# Patient Record
Sex: FEMALE | Race: WHITE
[De-identification: names, ages, dates, MRNs, and addresses within clinical notes are randomized per-mention and may not be internally consistent; named-entity substitution may affect disease eponyms.]

---

## 2020-06-13 ENCOUNTER — HOSPITAL ENCOUNTER (EMERGENCY)
Dept: HOSPITAL 26 - MED | Age: 35
Discharge: HOME | End: 2020-06-13
Payer: COMMERCIAL

## 2020-06-13 VITALS — DIASTOLIC BLOOD PRESSURE: 50 MMHG | SYSTOLIC BLOOD PRESSURE: 91 MMHG

## 2020-06-13 VITALS — HEIGHT: 65 IN | BODY MASS INDEX: 30.32 KG/M2 | WEIGHT: 182 LBS

## 2020-06-13 VITALS — DIASTOLIC BLOOD PRESSURE: 76 MMHG | SYSTOLIC BLOOD PRESSURE: 121 MMHG

## 2020-06-13 DIAGNOSIS — R11.10: ICD-10-CM

## 2020-06-13 DIAGNOSIS — Z79.899: ICD-10-CM

## 2020-06-13 DIAGNOSIS — R56.9: Primary | ICD-10-CM

## 2020-06-13 LAB
ALBUMIN FLD-MCNC: 3.7 G/DL (ref 3.4–5)
ANION GAP SERPL CALCULATED.3IONS-SCNC: 15.5 MMOL/L (ref 8–16)
AST SERPL-CCNC: 16 U/L (ref 15–37)
BASOPHILS # BLD AUTO: 0 K/UL (ref 0–0.22)
BASOPHILS NFR BLD AUTO: 0.2 % (ref 0–2)
BILIRUB SERPL-MCNC: 0.1 MG/DL (ref 0–1)
BUN SERPL-MCNC: 11 MG/DL (ref 7–18)
CHLORIDE SERPL-SCNC: 103 MMOL/L (ref 98–107)
CO2 SERPL-SCNC: 24 MMOL/L (ref 21–32)
CREAT SERPL-MCNC: 1 MG/DL (ref 0.6–1.3)
EOSINOPHIL # BLD AUTO: 0.2 K/UL (ref 0–0.4)
EOSINOPHIL NFR BLD AUTO: 1.2 % (ref 0–4)
ERYTHROCYTE [DISTWIDTH] IN BLOOD BY AUTOMATED COUNT: 13 % (ref 11.6–13.7)
GFR SERPL CREATININE-BSD FRML MDRD: 81 ML/MIN (ref 90–?)
GLUCOSE SERPL-MCNC: 98 MG/DL (ref 74–106)
HCT VFR BLD AUTO: 39 % (ref 36–48)
HGB BLD-MCNC: 13.2 G/DL (ref 12–16)
LYMPHOCYTES # BLD AUTO: 2.3 K/UL (ref 2.5–16.5)
LYMPHOCYTES NFR BLD AUTO: 17.8 % (ref 20.5–51.1)
MCH RBC QN AUTO: 32 PG (ref 27–31)
MCHC RBC AUTO-ENTMCNC: 34 G/DL (ref 33–37)
MCV RBC AUTO: 93.3 FL (ref 80–94)
MONOCYTES # BLD AUTO: 0.9 K/UL (ref 0.8–1)
MONOCYTES NFR BLD AUTO: 6.7 % (ref 1.7–9.3)
NEUTROPHILS # BLD AUTO: 9.8 K/UL (ref 1.8–7.7)
NEUTROPHILS NFR BLD AUTO: 74.1 % (ref 42.2–75.2)
PLATELET # BLD AUTO: 256 K/UL (ref 140–450)
POTASSIUM SERPL-SCNC: 3.5 MMOL/L (ref 3.5–5.1)
RBC # BLD AUTO: 4.18 MIL/UL (ref 4.2–5.4)
SODIUM SERPL-SCNC: 139 MMOL/L (ref 136–145)
WBC # BLD AUTO: 13.2 K/UL (ref 4.8–10.8)

## 2020-06-13 PROCEDURE — 80156 ASSAY CARBAMAZEPINE TOTAL: CPT

## 2020-06-13 PROCEDURE — 99285 EMERGENCY DEPT VISIT HI MDM: CPT

## 2020-06-13 PROCEDURE — 71045 X-RAY EXAM CHEST 1 VIEW: CPT

## 2020-06-13 PROCEDURE — 80053 COMPREHEN METABOLIC PANEL: CPT

## 2020-06-13 PROCEDURE — 96375 TX/PRO/DX INJ NEW DRUG ADDON: CPT

## 2020-06-13 PROCEDURE — 96361 HYDRATE IV INFUSION ADD-ON: CPT

## 2020-06-13 PROCEDURE — 96374 THER/PROPH/DIAG INJ IV PUSH: CPT

## 2020-06-13 PROCEDURE — 93005 ELECTROCARDIOGRAM TRACING: CPT

## 2020-06-13 PROCEDURE — 87040 BLOOD CULTURE FOR BACTERIA: CPT

## 2020-06-13 PROCEDURE — 85025 COMPLETE CBC W/AUTO DIFF WBC: CPT

## 2020-06-13 NOTE — NUR
SPOKE TO PTS  OVER THE PHONE, GAVE UPDATE AND RECIEVED MORE INFORMATION 
OF SEIZURE ACTIVITY. FIRST SEIZURE WAS ABOUT 1 MIN AND 10 SECS AND PT WAS SEEN 
HITTING HER LEFT SIDE OF HEAD ON TABLE. THE PT LAYED DOWN AND TOOK HER MISSED 
DOSE OF MEDICATION. SECOND SEIZURE WAS 3 MIN AFTER SHE TOOK MED AND LASTED 40 
SECONDS.

SUSY MADE AWARE.



JUAN VENTURAANDRES () [phone]

## 2020-06-13 NOTE — NUR
36 YO F BIBA FOR C/C OF 2 TONIC CLONIC SEIZURES PER REPORT OF EMS. PT 
EXPERIENCED TWO BACK TO BACK WITNESSED SEIZURES AT HOME BY . PER EMS 
REPORT  WAS A POOR HISTORIAN AND UNABLE TO SAY HOW LONG EACH SEIZURE 
WAS, BUT STATES SHE USED HER ARM TO CATCH HER FALL. PT EXPERIENCED VOMITING IN 
ROUTE, PRESENTS WITH VOMIT ON CLOTHING AND A SMALL ABRAISION TO HER LEFT EYE. 
PT IS A&Ox4. PERRLA PRESENT WITH 3MM SLUGGISH REACTION TO LIGHT. PT STATES SHE 
HAS A HA 4/10 IN FRONTAL AREA OF HEAD. PT STATES SHE HAS "NOT HAD A SEIZURE FOR 
9 YEARS." AIRWAY PATENT, S1 S2 HEARD, LUNG SOUNDS CLEAR THROUGHOUT, BOWEL 
SOUNDS NORMOACTIVE THROUGHOUT. PT STATES SHE IS CURRENTLY ON HER MENSTRAL 
CYCLE. LBM WAS YESTERDAY SOFT AND FORMED. PT DENIES FEVER, COUGH, SOB, AND 
TRAVEL. BED LOCKED AND IN LOWEST POSITION. SIDE RAILS X2. SEIZURE PRECAUTIONS 
IN PLACE. 



NKA

MED HX: EPILEPSY

RX: TEGRETOL

## 2020-06-13 NOTE — NUR
PTS  CONTACTED FOR PT DISCHARGE AND .  SAID HE WILL PULL 
UP TO ER LOBBY DOORS SO I CAN BRING HER TO CAR IN WHEELCHAIR.